# Patient Record
(demographics unavailable — no encounter records)

---

## 2018-04-28 NOTE — ED PDOC
HPI: CCC, URI, Sore Throat


Time Seen by Provider: 04/28/18 11:39


Chief Complaint (Nursing): Cough, Cold, Congestion


Chief Complaint (Provider): Cough


History Per: Patient


History/Exam Limitations: no limitations


Have you had recent travel within the past 21 days to any of the following 

countries: [place], [place], [place] or [place]?: No


Onset/Duration Of Symptoms: Days (2 days)


Current Symptoms Are (Timing): Better


Additional Complaint(s): 





Pt. with cough, wheezes.  No congestion, chest pain, headaches, body aches, 

weakness, fever, neck pain, headaches, leg pain.  Took albuterol today morning 

and now no more dyspnea/wheezes.  No abdominal pain, vomit, diarrhea.  No leg 

pain, long distance travel, or hormone use.





Past Medical History


Reviewed: Nursing Documentation, Vital Signs


Vital Signs: 





 Last Vital Signs











Temp  98.1 F   04/28/18 11:36


 


Pulse  68   04/28/18 11:36


 


Resp  17   04/28/18 11:36


 


BP  128/79   04/28/18 11:36


 


Pulse Ox  97   04/28/18 11:36














- Medical History


PMH: No Chronic Diseases





- Surgical History


Surgical History: Tonsillectomy





- Family History


Family History: States: Unknown Family Hx





- Living Arrangements


Living Arrangements: With Family





- Home Medications


Home Medications: 


 Ambulatory Orders











 Medication  Instructions  Recorded


 


Sulfamethoxazole/Trimethopri 1 tab PO BID #14 tab 07/11/15





[Bactrim Ds 800 mg-160 mg]  


 


Acetaminophen 2 tab PO Q4 #24 tab 10/10/15


 


Famotidine [Pepcid] 20 mg PO DAILY #15 tab 10/10/15


 


diaZEpam [Valium] 5 mg PO Q6 PRN #10 tab 10/10/15


 


Albuterol Sulfate [Proair Hfa] 0.09 mg IH Q6H PRN #2 inh 04/28/18


 


Albuterol Sulfate [Proair Hfa] 0.09 mg IH Q6H PRN #2 inh 04/28/18


 


predniSONE [predniSONE Tab] 20 mg PO BID 5 Days  tab 04/28/18














- Allergies


Allergies/Adverse Reactions: 


 Allergies











Allergy/AdvReac Type Severity Reaction Status Date / Time


 


acetaminophen [From Percocet] Allergy  ANAPHYLAXIS Verified 05/02/17 12:28


 


oxycodone [From Percocet] Allergy  ANAPHYLAXIS Verified 05/02/17 12:28














Review of Systems


ROS Statement: Except As Marked, All Systems Reviewed And Found Negative


Respiratory: Positive for: Cough, Shortness of Breath, Wheezing





Physical Exam





- Reviewed


Nursing Documentation Reviewed: Yes


Vital Signs Reviewed: Yes





- Physical Exam


Appears: Positive for: Non-toxic, No Acute Distress


Head Exam: Positive for: ATRAUMATIC, NORMAL INSPECTION, NORMOCEPHALIC


Skin: Positive for: Normal Color, Warm, DRY


Eye Exam: Positive for: EOMI, Normal appearance, PERRL


ENT: Positive for: Normal ENT Inspection.  Negative for: Nasal Congestion, 

Pharyngeal Erythema


Neck: Positive for: Normal, Painless ROM


Cardiovascular/Chest: Positive for: Regular Rate, Rhythm.  Negative for: Edema


Respiratory: Positive for: Normal Breath Sounds.  Negative for: Accessory 

Muscle Use, Wheezing


Gastrointestinal/Abdominal: Positive for: Normal Exam, Soft.  Negative for: 

Tenderness


Back: Positive for: Normal Inspection.  Negative for: L CVA Tenderness, R CVA 

Tenderness


Extremity: Positive for: Normal ROM.  Negative for: Tenderness, Pedal Edema


Neurologic/Psych: Positive for: Alert, Oriented





- ECG


ECG: Positive for: Interpreted By Me, Viewed By Me


ECG Rhythm: Positive for: Normal QRS, Normal ST Segment, Sinus Rhythm


O2 Sat by Pulse Oximetry: 97


Pulse Ox Interpretation: Normal





- Progress


ED Course And Treament: 





1156:  Pt. states no dyspnea since albuterol.  Wants albuterol rx.  No chest 

pain.  AAOx3.  Pain free.  Tolerated PO.  No wheezes, possible from albuterol 

use at home.





Disposition





- Clinical Impression


Clinical Impression: 


 Bronchitis








- Patient ED Disposition


Is Patient to be Admitted: No


Counseled Patient/Family Regarding: Studies Performed, Diagnosis, Need For 

Followup, Rx Given





- Disposition


Referrals: 


[facility] [Outside] - 04/30/18


Disposition: Routine/Home


Disposition Time: 11:58


Condition: STABLE


Additional Instructions: 


Return if not better in 3 days. 


Prescriptions: 


Albuterol Sulfate [Proair Hfa] 0.09 mg IH Q6H PRN #2 inh


 PRN Reason: Wheezing


Albuterol Sulfate [Proair Hfa] 0.09 mg IH Q6H PRN #2 inh


 PRN Reason: Wheezing


predniSONE [predniSONE Tab] 20 mg PO BID 5 Days  tab


Instructions:  Acute Bronchitis

## 2018-04-30 NOTE — CARD
--------------- APPROVED REPORT --------------





EKG Measurement

Heart [id number]

VT [id number]

[id number]

[id number]

[id number]



<Conclusion>

Normal sinus rhythm

Normal ECG